# Patient Record
Sex: FEMALE | Race: WHITE | NOT HISPANIC OR LATINO | Employment: FULL TIME | URBAN - METROPOLITAN AREA
[De-identification: names, ages, dates, MRNs, and addresses within clinical notes are randomized per-mention and may not be internally consistent; named-entity substitution may affect disease eponyms.]

---

## 2024-10-29 ENCOUNTER — OFFICE VISIT (OUTPATIENT)
Dept: FAMILY MEDICINE | Facility: CLINIC | Age: 29
End: 2024-10-29
Payer: COMMERCIAL

## 2024-10-29 VITALS
SYSTOLIC BLOOD PRESSURE: 114 MMHG | HEART RATE: 89 BPM | OXYGEN SATURATION: 98 % | DIASTOLIC BLOOD PRESSURE: 62 MMHG | HEIGHT: 60 IN | WEIGHT: 180.81 LBS | BODY MASS INDEX: 35.5 KG/M2

## 2024-10-29 DIAGNOSIS — Z11.3 SCREEN FOR STD (SEXUALLY TRANSMITTED DISEASE): ICD-10-CM

## 2024-10-29 DIAGNOSIS — Z34.90 PREGNANCY, UNSPECIFIED GESTATIONAL AGE: Primary | ICD-10-CM

## 2024-10-29 DIAGNOSIS — N93.9 ABNORMAL UTERINE BLEEDING: ICD-10-CM

## 2024-10-29 DIAGNOSIS — Z13.220 SCREENING FOR LIPID DISORDERS: ICD-10-CM

## 2024-10-29 LAB
B-HCG UR QL: POSITIVE
CTP QC/QA: YES

## 2024-10-29 PROCEDURE — 1159F MED LIST DOCD IN RCRD: CPT | Mod: CPTII,S$GLB,, | Performed by: STUDENT IN AN ORGANIZED HEALTH CARE EDUCATION/TRAINING PROGRAM

## 2024-10-29 PROCEDURE — 87491 CHLMYD TRACH DNA AMP PROBE: CPT | Performed by: STUDENT IN AN ORGANIZED HEALTH CARE EDUCATION/TRAINING PROGRAM

## 2024-10-29 PROCEDURE — 1160F RVW MEDS BY RX/DR IN RCRD: CPT | Mod: CPTII,S$GLB,, | Performed by: STUDENT IN AN ORGANIZED HEALTH CARE EDUCATION/TRAINING PROGRAM

## 2024-10-29 PROCEDURE — 81025 URINE PREGNANCY TEST: CPT | Mod: S$GLB,,, | Performed by: STUDENT IN AN ORGANIZED HEALTH CARE EDUCATION/TRAINING PROGRAM

## 2024-10-29 PROCEDURE — 87591 N.GONORRHOEAE DNA AMP PROB: CPT | Performed by: STUDENT IN AN ORGANIZED HEALTH CARE EDUCATION/TRAINING PROGRAM

## 2024-10-29 PROCEDURE — 99204 OFFICE O/P NEW MOD 45 MIN: CPT | Mod: S$GLB,,, | Performed by: STUDENT IN AN ORGANIZED HEALTH CARE EDUCATION/TRAINING PROGRAM

## 2024-10-29 PROCEDURE — 3074F SYST BP LT 130 MM HG: CPT | Mod: CPTII,S$GLB,, | Performed by: STUDENT IN AN ORGANIZED HEALTH CARE EDUCATION/TRAINING PROGRAM

## 2024-10-29 PROCEDURE — G2211 COMPLEX E/M VISIT ADD ON: HCPCS | Mod: S$GLB,,, | Performed by: STUDENT IN AN ORGANIZED HEALTH CARE EDUCATION/TRAINING PROGRAM

## 2024-10-29 PROCEDURE — 0352U VAGINOSIS SCREEN BY DNA PROBE: CPT | Performed by: STUDENT IN AN ORGANIZED HEALTH CARE EDUCATION/TRAINING PROGRAM

## 2024-10-29 PROCEDURE — 3078F DIAST BP <80 MM HG: CPT | Mod: CPTII,S$GLB,, | Performed by: STUDENT IN AN ORGANIZED HEALTH CARE EDUCATION/TRAINING PROGRAM

## 2024-10-29 PROCEDURE — 3008F BODY MASS INDEX DOCD: CPT | Mod: CPTII,S$GLB,, | Performed by: STUDENT IN AN ORGANIZED HEALTH CARE EDUCATION/TRAINING PROGRAM

## 2024-10-31 LAB
C TRACH DNA SPEC QL NAA+PROBE: NOT DETECTED
N GONORRHOEA DNA SPEC QL NAA+PROBE: NOT DETECTED

## 2024-11-01 DIAGNOSIS — N76.0 BACTERIAL VAGINOSIS: Primary | ICD-10-CM

## 2024-11-01 DIAGNOSIS — B96.89 BACTERIAL VAGINOSIS: Primary | ICD-10-CM

## 2024-11-01 LAB
BACTERIAL VAGINOSIS DNA: DETECTED
CANDIDA GLABRATA/KRUSEI: NOT DETECTED
CANDIDA RRNA VAG QL PROBE: NOT DETECTED
TRICHOMONAS VAGINALIS: NOT DETECTED

## 2024-11-01 RX ORDER — METRONIDAZOLE 500 MG/1
500 TABLET ORAL EVERY 12 HOURS
Qty: 14 TABLET | Refills: 0 | Status: SHIPPED | OUTPATIENT
Start: 2024-11-01 | End: 2024-11-08

## 2025-01-03 ENCOUNTER — OFFICE VISIT (OUTPATIENT)
Dept: FAMILY MEDICINE | Facility: CLINIC | Age: 30
End: 2025-01-03
Payer: COMMERCIAL

## 2025-01-03 ENCOUNTER — PATIENT MESSAGE (OUTPATIENT)
Dept: ADMINISTRATIVE | Facility: OTHER | Age: 30
End: 2025-01-03
Payer: COMMERCIAL

## 2025-01-03 VITALS
OXYGEN SATURATION: 99 % | HEIGHT: 60 IN | HEART RATE: 103 BPM | BODY MASS INDEX: 34.3 KG/M2 | SYSTOLIC BLOOD PRESSURE: 122 MMHG | WEIGHT: 174.69 LBS | DIASTOLIC BLOOD PRESSURE: 80 MMHG

## 2025-01-03 DIAGNOSIS — Z30.09 BIRTH CONTROL COUNSELING: ICD-10-CM

## 2025-01-03 DIAGNOSIS — Z87.59 HISTORY OF MISCARRIAGE: Primary | ICD-10-CM

## 2025-01-03 DIAGNOSIS — G56.01 CARPAL TUNNEL SYNDROME OF RIGHT WRIST: ICD-10-CM

## 2025-01-03 DIAGNOSIS — H93.8X3 SENSATION OF FULLNESS IN BOTH EARS: ICD-10-CM

## 2025-01-03 DIAGNOSIS — G56.21 CUBITAL TUNNEL SYNDROME ON RIGHT: ICD-10-CM

## 2025-01-03 NOTE — PATIENT INSTRUCTIONS
Solis Cowan,     If you are due for any health screening(s) below please notify me so we can arrange them to be ordered and scheduled. Most healthy patients at your age complete them, but you are free to accept or refuse.     If you can't do it, I'll definitely understand. If you can, I'd certainly appreciate it!    Tests to Keep You Healthy    Cervical Cancer Screening: DUE      Your cervical cancer screening is due     Our records indicate that you may be overdue for your screening Pap smear. A Pap smear is an important health screening that can detect abnormal cells that can become cervical cancer. Cervical cancer screenings allow for early diagnosis and increase the likelihood of successful treatment.     The current recommendation for Pap smear screening is every 3-5 years for women at average risk. We encourage you to schedule your appointment with your womens health provider. Many women see a gynecologist for this screening, but some primary care providers also provide Pap screening.     If you recently had your Pap smear screening performed outside of [facility], please let your health care team know so that they can update your health record.

## 2025-01-03 NOTE — Clinical Note
Last pap smear completed in [place], [place] in September of 2022 at [facility]. Phone: [phone]

## 2025-01-03 NOTE — PROGRESS NOTES
[facility]  Primary Care Clinic - [place], [place]    Family Medicine Office Visit    Subjective     Patient ID: Camryn Haji is a 29 y.o. female who presents to clinic today to follow up.     Medical history, surgical history, medications, allergies, and social history were reviewed and updated.     Chief Complaint: Follow-up    HPI    Has not had her on 10/29/2024 and was found to be pregnant.  Reports that about 1 month ago, she started having vaginal bleeding. This went on for several days. She then believed she had a miscarriage. She believes this occurred at around 8 weeks. Reports bleeding has fully stopped. Has not yet had another menstrual cycle.  Point of care urine pregnancy test is negative today.    She reported that she would like to get on birth control in his requesting referral back to her OBGYN as she was previously on Nexplanon and this worked well for her.  She is comfortable waiting at least 3 years before trying for another pregnancy.    She reports that she was previously followed by an ENT in [place] and they had recommended tympanostomy tube placement.  She reported that she moved prior to having this completed.  Requesting referral to ENT for further evaluation to discuss possible procedure.    She reported that she also has a history of carpal tunnel in his had her left wrist surgically repaired.  She reported that she also needs repair on her right wrist and also has cubital tunnel syndrome.  Requesting referral to orthopedics for further evaluation and management.    Vitals:    01/03/25 1308   BP: 122/80   Pulse: 103      Wt Readings from Last 3 Encounters:   01/03/25 1308 79.2 kg (174 lb 11.2 oz)   10/29/24 1523 82 kg (180 lb 12.8 oz)      Review of Systems    Review of Systems otherwise negative unless specified above.        Objective     Physical Exam  Vitals reviewed.   Constitutional:       General: She is not in acute distress.     Appearance: Normal appearance. She is  obese.   HENT:      Head: Normocephalic and atraumatic.      Nose: Nose normal.      Mouth/Throat:      Mouth: Mucous membranes are moist.   Cardiovascular:      Rate and Rhythm: Regular rhythm. Tachycardia present.      Heart sounds: No murmur heard.  Pulmonary:      Effort: Pulmonary effort is normal. No respiratory distress.      Breath sounds: Normal breath sounds.   Musculoskeletal:         General: Normal range of motion.   Skin:     General: Skin is warm and dry.   Neurological:      General: No focal deficit present.      Mental Status: She is alert and oriented to person, place, and time.   Psychiatric:         Mood and Affect: Mood normal.         Behavior: Behavior normal.            Assessment and Plan     No current outpatient medications     1. Birth control counseling  -     Ambulatory referral/consult to Obstetrics / Gynecology; Future; Expected date: 01/10/2025    2. Sensation of fullness in both ears  -     Ambulatory referral/consult to ENT; Future; Expected date: 01/10/2025    3. Carpal tunnel syndrome of right wrist  -     Ambulatory referral/consult to Orthopedics; Future; Expected date: 01/10/2025    4. Cubital tunnel syndrome on right  -     Ambulatory referral/consult to Orthopedics; Future; Expected date: 01/10/2025        Placing referral to OBGYN, ENT, and Orthopedics per her request.  Recommended that she use condoms for now to prevent pregnancy.  She reports completing a Pap smear in [place] and we will work on obtaining these records.  Declined flu shot today.  We will otherwise plan to have follow up in 6 months or sooner if needed.    Visit today included increased complexity associated with the care of the episodic problem birth control counseling, carpal tunnel addressed and managing the longitudinal care of the patient due to the serious and/or complex managed problem(s) birth control counseling, carpal tunnel.         Follow up in about 6 months (around 7/3/2025) for Follow up  with [facility].    Questions were invited and answered. No other acute concerns at this time. Will plan to follow up as above or sooner if needed.     Antonella Augustine,   01/03/2025 1:24 PM       This dictation has been generated using Modal Fluency Dictation. Some phonetic errors may occur. Please contact author for clarification if needed.

## 2025-01-07 ENCOUNTER — PATIENT OUTREACH (OUTPATIENT)
Dept: ADMINISTRATIVE | Facility: HOSPITAL | Age: 30
End: 2025-01-07
Payer: COMMERCIAL

## 2025-01-07 NOTE — PROGRESS NOTES
Population Health Chart Review & Patient Outreach Details      Additional Pop Health Notes:               Updates Requested / Reviewed:      Updated Care Coordination Note and External Sources: [facility] and [facility]         Health Maintenance Topics Overdue:      VB Score: 1     Cervical Cancer Screening                       Health Maintenance Topic(s) Outreach Outcomes & Actions Taken:    Cervical Cancer Screening - Outreach Outcomes & Actions Taken  : External Records Requested & Care Team Updated if Applicable

## 2025-01-07 NOTE — LETTER
"       AUTHORIZATION FOR RELEASE OF   CONFIDENTIAL INFORMATION    Dear [facility],    We are seeing Camryn Haji, date of birth 1995, in the clinic at [facility]. Antonella Augustine DO is the patient's PCP. Camryn Haji has an outstanding lab/procedure at the time we reviewed her chart. In order to help keep her health information updated, she has authorized us to request the following medical record(s):        (  )  MAMMOGRAM                                      (  )  COLONOSCOPY      ( X )  PAP SMEAR                                          (  )  OUTSIDE LAB RESULTS     (  )  DEXA SCAN                                          (  )  EYE EXAM            (  )  FOOT EXAM                                          (  )  ENTIRE RECORD     (  )  OUTSIDE IMMUNIZATIONS                 (  )  _______________         Please fax records to [facility], [facility], [place], DO at [phone]    Thanks so much and have a great day!    Yoko Bradford LPN [address] [address]  - [phone]   [phone]            Patient Name: Camryn Haji  : 1995  Patient Phone #: [phone]          A. Consent for Examination and Treatment: I hereby authorize the providers and employees of [facility] to provide medical treatment/services which includes, but is not limited to, performing and administering tests and diagnostic procedures that are deemed necessary, Including, but not limited to, imaging examinations, blood tests and other laboratory procedures as may be required by the hospital, clinic, or may be ordered by my physician(s) or persons working under the general and/or special instructions of my physician(s).                   1.      I understand and agree that this consent covers all authorized persons, including but not limited to physicians, residents, nurse practitioners, physicians' assistants, specialists, " consultants, student nurses, and independently contracted physicians, who are called upon by the physician in charge, to carry out the diagnostic procedures and medical or surgical treatment.  2.      I hereby authorize [facility] to retain or dispose of any specimens or tissue, should there be such remaining from any test or procedure.  3.      I hereby authorize and give consent for [facility] providers and employees to take photographs, images or videotapes of such diagnostic, surgical or treatment procedures of Patient as may be required by [facility] or as may be ordered by a physician.  I further acknowledge and agree that [facility] may use cameras or other devices for patient monitoring.  4.      I am aware that the practice of medicine is not an exact science, and I acknowledge that no guarantees have been made to me as to the outcome of any tests, procedures or treatment.                   B. Authorization for Release of Information:  I understand that my insurance company and/or their agents may need information necessary to make determinations about payment/reimbursement.  I hereby provide authorization to release to all insurance companies, their successors, assignees, other parties with whom they may have contracted, or others acting on their behalf, that are involved with payment for any hospital and/or clinic charges incurred by the patient, any information that they request and deem necessary for payment/reimbursement, and/or quality review.  I further authorize the release of my health information to physicians or other health care practitioners on staff who are involved in my health care now and in the future, and to other health care providers, entities, or institutions for the purpose of my continued care and treatment, including referrals.     C. Medicare Patient's Certification and Authorization to Release Information and Payment Request:  I certify that the information given by me in applying for payment  under Title XVIII of the Social Security Act is correct.  I authorize any franco of medical or other information about me to release to the Social Security Administration, or its intermediaries or carriers, any information needed for this or a related Medicare claim.  I request that payment of authorized benefits be made on my behalf.     D. Assignment of Insurance Benefits:  I hereby authorize any and all insurance companies, health plans, defined benefit plans, health insurers or any entity that is or may be responsible for payment of my medical expenses to pay all hospital and medical benefits now due, and to become due and payable to me under any hospital benefits, sick benefits, injury benefits or any other benefit for services rendered to me, including Major Medical Benefits, direct to [facility] and all independently contracted physicians.  I assign any and all rights that I may have against any and all insurance companies, health plans, defined benefit plans, health insurers or any entity that is or may be responsible for payment of my medical expenses, including, but not limited to any right to appeal a denial of a claim, any right to bring any action, lawsuit, administrative proceeding, or other cause of action on my behalf.  I specifically assign my right to pursue litigation against any and all insurance companies, health plans, defined benefit plans, health insurers or any entity that is or may be responsible for payment of medical expenses based upon a refusal to pay charges.              REGISTRATION  AUTHORIZATION                    E. Valuables:  It is understood and agreed that [facility] is not liable for the damage to or loss of any money, jewelry, documents, dentures, eye glasses, hearing aids, prosthetics, or other property of value.     F. Computer Equipment:  I understand and agree that should I choose to use computer equipment owned by [facility] or if I choose to access the Internet via [facility]'s  network, I do so at my own risk.  [facility] is not responsible for any damage to my computer equipment or to any damages of any type that might arise from my loss of equipment or data.                   G. Acceptance of Financial Responsibility:  I agree that in consideration of the services and supplies that have been or will be furnished to the patient,  I am hereby obligated to pay all charges made for or on the account of the patient according to the standard rates (in effect at the time the services and supplies are delivered) established by [facility], including its Patient Financial Assistance Policy to the extent it is applicable.  I understand that I am responsible for all charges, or portions thereof, not covered by insurance or other sources.  Patient refunds will be distributed only after balances at all [facility] facilities are paid.                   H. Communication Authorization:  I hereby authorize [facility] and its representatives, along with any billing service or  who may work on their behalf, to contact me on my cell phone and/or home phone using prerecorded messages, artificial voice messages, automatic telephone dialing devices or other computer assisted technology, or by electronic mail, text messaging, or by any other form of electronic communication.  This includes, but is not limited to appointment reminders, yearly physical exam reminders, preventive care reminders, patient campaigns, welcome calls, and calls about account balances on my account or any account on which I am listed as a guarantor.  I understand I have the right to opt out of these communications at any time.     I.  Relationship Between Facility and Physician:  I understand that some, but not all, providers furnishing services to the patient are not employees or agents of [facility].  The patient is under the care and supervision of his/her attending physician, and it is the responsibility of the facility and its  nursing staff to carry out the instructions of such physicians.  It is the responsibility of the patient's physician/designee to obtain the patient's informed consent, when required, for medical or surgical treatment, special diagnostic or therapeutic procedures, or hospital services rendered for the patient under the special instructions of the physician/designee.     J. Notice of Private Practices:  I acknowledge I have received a copy of [facility]'s Notice of Privacy Practices.     K. Facility Directory:  I have discussed with the organization my desire to be either included or excluded in the facility directory.  I understand that if my choice is to opt-out of being identified in the facility directory that the facility will not provide any information about me such as my condition (e.g. Fair, stable, etc.) or my location in the facility (e.g. Room number, department).     L. LINKS:  For [place] Residents:  [facility] is a LINKS ([place] Immunization [employer] for Kids Statewide) participating facility.  LINKS is a [facility]-sponsored confidential computer system that helps you and your doctor keep track of you and your child's immunization history.  I acknowledge that I am allowing [facility] to share this information with [facility].  For [place] Residents:  [facility] is a MIIX ([place] Immunization Information eXchange) participant.  MIIX is a [facility]-sponsored confidential computer system that helps you and your doctor keep track of you and your child's immunization history.  I acknowledge that I am allowing [facility] to share information with [facility].     M. Term:  This authorization is valid for this and subsequent care/treatment I receive at [facility] and will remain valid unless/until revoked in writing by me.      ACKNOWLEDGMENT OF POTENTIAL RISKS OF COVID-19 VACCINE  It is important that you, as the patient or patients legally authorized representative(s), understand and acknowledge the  following, with regard to administration of the COVID-19 vaccine offered by [facility]:  The SARS-CoV-2 virus (COVID-19) has caused an unprecedented modern global pandemic that has mobilized scientists and drug manufacturers to work to create safe and effective vaccines to get the crisis under control.  No vaccine is released in the [place] without undergoing rigorous, multi-layered testing and approval by the Food and Drug Administration.  During a public health emergency, however, vaccines can be released for patient administration by the FDA prior to completion of multi-phase clinical trials and approval. This is done by the FDAs granting of Emergency Use Authorization (EUA) when the vaccine meets reasonable thresholds for safety and effectiveness and people are in urgent need of care. Under an EUA, the FDA has found that known and potential benefits outweigh its known and potential risks.  The vaccine for which you are presenting to [facility] has been released under an EUA, which [facility] is honoring in its distribution of the vaccine to the public. While the FDAs authorization indicates its belief that usage is recommended over possible risks, there is still the possibility that unknown risks of the vaccine could exist.  By signing this document, you acknowledge and assume these risks. Further, you waive any and all claims of liability against and hold harmless any [facility] entity or provider for any harm caused to you by said possible unknown risks of the vaccine.        N. [facility]:  As used in this document, [facility] means all [facility] affiliated entities including all health centers, surgery centers, clinics, and hospitals.  It includes more specifically, the following entities: [facility], a not for profit [facility], and its subsidiaries and affiliates, including [facility], [facility], [facility], [facility], [facility], [facility]d/b/a [facility], [facility], L.L.C.d/b/a [facility] [employer], L[facility] As manager of [facility], [facility], [facility] [employer], L.L.C.d/b/a [facility], [facility], [facility], [facility] and [facility] as manager of [facility].                                                                Patient/Legal Guardian Signature                                                    This signature was collected at 10/28/2024      /                                                                            Printed Name/Relationship to Patient                                                [facility] complies with applicable Federal civil rights laws and does not discriminate on the basis of race, color, national origin, age, disability, or sex.          ATENCIÓN: si habla epifanioañol, tiene a dumont disposición servicios gratuitos de asistencia lingüística. Aydin al [phone].         CHÚ Ý: N?u b?n nói Ti?ng Vi?t, có các d?ch v? h? tr? ngôn ng? mi?n phí dành cho b?n. G?i s? [phone].              REGISTRATION  AUTHORIZATION

## 2025-01-09 ENCOUNTER — PATIENT MESSAGE (OUTPATIENT)
Dept: ADMINISTRATIVE | Facility: HOSPITAL | Age: 30
End: 2025-01-09
Payer: COMMERCIAL

## 2025-02-06 ENCOUNTER — PATIENT MESSAGE (OUTPATIENT)
Dept: FAMILY MEDICINE | Facility: CLINIC | Age: 30
End: 2025-02-06
Payer: COMMERCIAL

## 2025-03-03 ENCOUNTER — PATIENT MESSAGE (OUTPATIENT)
Dept: FAMILY MEDICINE | Facility: CLINIC | Age: 30
End: 2025-03-03
Payer: COMMERCIAL

## 2025-04-01 ENCOUNTER — PATIENT MESSAGE (OUTPATIENT)
Dept: FAMILY MEDICINE | Facility: CLINIC | Age: 30
End: 2025-04-01
Payer: COMMERCIAL

## 2025-04-01 ENCOUNTER — PATIENT OUTREACH (OUTPATIENT)
Dept: ADMINISTRATIVE | Facility: HOSPITAL | Age: 30
End: 2025-04-01
Payer: COMMERCIAL

## 2025-04-01 NOTE — PROGRESS NOTES
Population Health Chart Review & Patient Outreach Details      Additional Pop Health Notes:               Updates Requested / Reviewed:      Updated Care Coordination Note         Health Maintenance Topics Overdue:      [facility] Score: 1     Cervical Cancer Screening                       Health Maintenance Topic(s) Outreach Outcomes & Actions Taken:    Cervical Cancer Screening - Outreach Outcomes & Actions Taken  : External Records Requested & Care Team Updated if Applicable

## 2025-04-01 NOTE — LETTER
"                                   **SECOND REQUEST**      AUTHORIZATION FOR RELEASE OF   CONFIDENTIAL INFORMATION    Dear [facility],    We are seeing Camryn Haji, date of birth 1995, in the clinic at [facility]. Antonella Augustine DO is the patient's PCP. Camryn Haji has an outstanding lab/procedure at the time we reviewed her chart. In order to help keep her health information updated, she has authorized us to request the following medical record(s):        (  )  MAMMOGRAM                                      (  )  COLONOSCOPY      ( X )  PAP SMEAR                                          (  )  OUTSIDE LAB RESULTS     (  )  DEXA SCAN                                          (  )  EYE EXAM            (  )  FOOT EXAM                                          (  )  ENTIRE RECORD     (  )  OUTSIDE IMMUNIZATIONS                 (  )  _______________         Please fax records to [facility], [place], Taylor, DO at [phone]    Thanks so much and have a great day!    Yoko Bradford LPN [address] [address]  - [phone]   [phone]           Patient Name: Camryn Haji  : 1995  Patient Phone #: [phone]          A. Consent for Examination and Treatment: I hereby authorize the providers and employees of [facility] to provide medical treatment/services which includes, but is not limited to, performing and administering tests and diagnostic procedures that are deemed necessary, Including, but not limited to, imaging examinations, blood tests and other laboratory procedures as may be required by the hospital, clinic, or may be ordered by my physician(s) or persons working under the general and/or special instructions of my physician(s).                   1.      I understand and agree that this consent covers all authorized persons, including but not limited to physicians, residents, nurse " practitioners, physicians' assistants, specialists, consultants, student nurses, and independently contracted physicians, who are called upon by the physician in charge, to carry out the diagnostic procedures and medical or surgical treatment.  2.      I hereby authorize [facility] to retain or dispose of any specimens or tissue, should there be such remaining from any test or procedure.  3.      I hereby authorize and give consent for [facility] providers and employees to take photographs, images or videotapes of such diagnostic, surgical or treatment procedures of Patient as may be required by [facility] or as may be ordered by a physician.  I further acknowledge and agree that [facility] may use cameras or other devices for patient monitoring.  4.      I am aware that the practice of medicine is not an exact science, and I acknowledge that no guarantees have been made to me as to the outcome of any tests, procedures or treatment.                   B. Authorization for Release of Information:  I understand that my insurance company and/or their agents may need information necessary to make determinations about payment/reimbursement.  I hereby provide authorization to release to all insurance companies, their successors, assignees, other parties with whom they may have contracted, or others acting on their behalf, that are involved with payment for any hospital and/or clinic charges incurred by the patient, any information that they request and deem necessary for payment/reimbursement, and/or quality review.  I further authorize the release of my health information to physicians or other health care practitioners on staff who are involved in my health care now and in the future, and to other health care providers, entities, or institutions for the purpose of my continued care and treatment, including referrals.     C. Medicare Patient's Certification and Authorization to Release Information and Payment Request:  I certify that  the information given by me in applying for payment under Title XVIII of the Social Security Act is correct.  I authorize any franco of medical or other information about me to release to the Social Security Administration, or its intermediaries or carriers, any information needed for this or a related Medicare claim.  I request that payment of authorized benefits be made on my behalf.     D. Assignment of Insurance Benefits:  I hereby authorize any and all insurance companies, health plans, defined benefit plans, health insurers or any entity that is or may be responsible for payment of my medical expenses to pay all hospital and medical benefits now due, and to become due and payable to me under any hospital benefits, sick benefits, injury benefits or any other benefit for services rendered to me, including Major Medical Benefits, direct to [facility] and all independently contracted physicians.  I assign any and all rights that I may have against any and all insurance companies, health plans, defined benefit plans, health insurers or any entity that is or may be responsible for payment of my medical expenses, including, but not limited to any right to appeal a denial of a claim, any right to bring any action, lawsuit, administrative proceeding, or other cause of action on my behalf.  I specifically assign my right to pursue litigation against any and all insurance companies, health plans, defined benefit plans, health insurers or any entity that is or may be responsible for payment of medical expenses based upon a refusal to pay charges.              REGISTRATION  AUTHORIZATION                    E. Valuables:  It is understood and agreed that [facility] is not liable for the damage to or loss of any money, jewelry, documents, dentures, eye glasses, hearing aids, prosthetics, or other property of value.     F. Computer Equipment:  I understand and agree that should I choose to use computer equipment owned by [facility] or  if I choose to access the Internet via [facility]'s network, I do so at my own risk.  [facility] is not responsible for any damage to my computer equipment or to any damages of any type that might arise from my loss of equipment or data.                   G. Acceptance of Financial Responsibility:  I agree that in consideration of the services and supplies that have been or will be furnished to the patient,  I am hereby obligated to pay all charges made for or on the account of the patient according to the standard rates (in effect at the time the services and supplies are delivered) established by [facility], including its Patient Financial Assistance Policy to the extent it is applicable.  I understand that I am responsible for all charges, or portions thereof, not covered by insurance or other sources.  Patient refunds will be distributed only after balances at all [facility] facilities are paid.                   H. Communication Authorization:  I hereby authorize [facility] and its representatives, along with any billing service or  who may work on their behalf, to contact me on my cell phone and/or home phone using prerecorded messages, artificial voice messages, automatic telephone dialing devices or other computer assisted technology, or by electronic mail, text messaging, or by any other form of electronic communication.  This includes, but is not limited to appointment reminders, yearly physical exam reminders, preventive care reminders, patient campaigns, welcome calls, and calls about account balances on my account or any account on which I am listed as a guarantor.  I understand I have the right to opt out of these communications at any time.     I.  Relationship Between Facility and Physician:  I understand that some, but not all, providers furnishing services to the patient are not employees or agents of [facility].  The patient is under the care and supervision of his/her attending physician, and it is  the responsibility of the facility and its nursing staff to carry out the instructions of such physicians.  It is the responsibility of the patient's physician/designee to obtain the patient's informed consent, when required, for medical or surgical treatment, special diagnostic or therapeutic procedures, or hospital services rendered for the patient under the special instructions of the physician/designee.     J. Notice of Private Practices:  I acknowledge I have received a copy of [facility]'s Notice of Privacy Practices.     K. Facility Directory:  I have discussed with the organization my desire to be either included or excluded in the facility directory.  I understand that if my choice is to opt-out of being identified in the facility directory that the facility will not provide any information about me such as my condition (e.g. Fair, stable, etc.) or my location in the facility (e.g. Room number, department).     L. LINKS:  For [place] Residents:  [facility] is a LINKS ([place] [facility]) participating facility.  LINKS is a [facility]-sponsored confidential computer system that helps you and your doctor keep track of you and your child's immunization history.  I acknowledge that I am allowing [facility] to share this information with [facility].  For [place] Residents:  [facility] is a MIIX ([place] Immunization Information eXchange) participant.  MIIX is a [facility]-sponsored confidential computer system that helps you and your doctor keep track of you and your child's immunization history.  I acknowledge that I am allowing [facility] to share information with [employer].     M. Term:  This authorization is valid for this and subsequent care/treatment I receive at [facility] and will remain valid unless/until revoked in writing by me.      ACKNOWLEDGMENT OF POTENTIAL RISKS OF COVID-19 VACCINE  It is important that you, as the patient or patients legally authorized  representative(s), understand and acknowledge the following, with regard to administration of the COVID-19 vaccine offered by [facility]:  The SARS-CoV-2 virus (COVID-19) has caused an unprecedented modern global pandemic that has mobilized scientists and drug manufacturers to work to create safe and effective vaccines to get the crisis under control.  No vaccine is released in the [place] without undergoing rigorous, multi-layered testing and approval by the Food and Drug Administration.  During a public health emergency, however, vaccines can be released for patient administration by the FDA prior to completion of multi-phase clinical trials and approval. This is done by the FDAs granting of Emergency Use Authorization (EUA) when the vaccine meets reasonable thresholds for safety and effectiveness and people are in urgent need of care. Under an EUA, the FDA has found that known and potential benefits outweigh its known and potential risks.  The vaccine for which you are presenting to [facility] has been released under an EUA, which [facility] is honoring in its distribution of the vaccine to the public. While the FDAs authorization indicates its belief that usage is recommended over possible risks, there is still the possibility that unknown risks of the vaccine could exist.  By signing this document, you acknowledge and assume these risks. Further, you waive any and all claims of liability against and hold harmless any [facility] entity or provider for any harm caused to you by said possible unknown risks of the vaccine.        N. [facility]:  As used in this document, [facility] means all [facility] affiliated entities including all health centers, surgery centers, clinics, and hospitals.  It includes more specifically, the following entities: [facility], a not for profit [employer], and its subsidiaries and affiliates, including [facility]  [place], [facility], [facility], [facility], [facility], [facility], [facility], [facility], [facility], [facility]., [facility], [facility].d/b/a [facility], L.L.C.d/b/a [facility], [facility], L.L[facility]C. As manager of [facility], [facility], [facility], L.L.C.d/b/a [facility], [facility], [facility], [facility], [facility] and [facility] as manager of [facility].                                                                Patient/Legal Guardian Signature                                                    This signature was collected at 10/28/2024      /                                                                            Printed Name/Relationship to Patient                                                [facility] complies with applicable Federal civil rights laws and does not discriminate on the basis of race, color, national origin, age, disability, or sex.          ATENCIÓN: si habla español, tiene a dumont disposición servicios gratuitos de asistencia lingüística. Aydin lala [phone].         [facility] Ý: N?u b?n nói Ti?ng Vi?t, có các d?ch v? h? tr? ngôn ng? mi?n phí dành cho b?n. G?i s? [phone].              REGISTRATION  AUTHORIZATION